# Patient Record
Sex: FEMALE | Race: BLACK OR AFRICAN AMERICAN | NOT HISPANIC OR LATINO | Employment: OTHER | URBAN - METROPOLITAN AREA
[De-identification: names, ages, dates, MRNs, and addresses within clinical notes are randomized per-mention and may not be internally consistent; named-entity substitution may affect disease eponyms.]

---

## 2017-10-31 ENCOUNTER — OFFICE VISIT (OUTPATIENT)
Dept: SURGERY | Facility: CLINIC | Age: 67
End: 2017-10-31
Payer: MEDICARE

## 2017-10-31 ENCOUNTER — TELEPHONE (OUTPATIENT)
Dept: ENDOSCOPY | Facility: HOSPITAL | Age: 67
End: 2017-10-31

## 2017-10-31 VITALS — HEART RATE: 65 BPM | SYSTOLIC BLOOD PRESSURE: 141 MMHG | WEIGHT: 199.5 LBS | DIASTOLIC BLOOD PRESSURE: 59 MMHG

## 2017-10-31 DIAGNOSIS — Z87.19 HISTORY OF ULCERATIVE COLITIS: Primary | ICD-10-CM

## 2017-10-31 DIAGNOSIS — R19.7 DIARRHEA, UNSPECIFIED TYPE: ICD-10-CM

## 2017-10-31 PROCEDURE — 99203 OFFICE O/P NEW LOW 30 MIN: CPT | Mod: PBBFAC | Performed by: COLON & RECTAL SURGERY

## 2017-10-31 PROCEDURE — 99203 OFFICE O/P NEW LOW 30 MIN: CPT | Mod: S$PBB,,, | Performed by: COLON & RECTAL SURGERY

## 2017-10-31 PROCEDURE — 99999 PR PBB SHADOW E&M-NEW PATIENT-LVL III: CPT | Mod: PBBFAC,,, | Performed by: COLON & RECTAL SURGERY

## 2017-10-31 RX ORDER — CHOLESTYRAMINE 4 G/9G
4 POWDER, FOR SUSPENSION ORAL
Qty: 60 PACKET | Refills: 3 | Status: SHIPPED | OUTPATIENT
Start: 2017-10-31 | End: 2017-11-01 | Stop reason: SDUPTHER

## 2017-10-31 RX ORDER — LEVOTHYROXINE SODIUM 25 UG/1
25 TABLET ORAL DAILY
COMMUNITY

## 2017-10-31 RX ORDER — HYDROCHLOROTHIAZIDE 12.5 MG/1
12.5 TABLET ORAL DAILY
COMMUNITY

## 2017-10-31 RX ORDER — NAPROXEN SODIUM 220 MG/1
81 TABLET, FILM COATED ORAL DAILY
COMMUNITY

## 2017-10-31 RX ORDER — FERROUS SULFATE 324(65)MG
325 TABLET, DELAYED RELEASE (ENTERIC COATED) ORAL DAILY
COMMUNITY

## 2017-10-31 NOTE — LETTER
November 13, 2017      Maggie Tatum MD  [address] [place] [address] Blvd  [address]  [facility]  [address] [address]           [facility]  [address]  [facility] [address]  Phone: [phone]          Patient: Daisy Brambila   MR Number: [id number]   YOB: 1950   Date of Visit: 10/31/2017       Dear Dr. Maggie Tatum:    Thank you for referring Daisy Brambila to me for evaluation. Attached you will find relevant portions of my assessment and plan of care.    If you have questions, please do not hesitate to call me. I look forward to following Daisy Brambila along with you.    Sincerely,    Jodi Phillips  CC:  No Recipients    If you would like to receive this communication electronically, please contact [email or web address] or [phone] to request more information on [employer] Link access.    For providers and/or their staff who would like to refer a patient to [facility], please contact us through our one-stop-shop provider referral line, [facility], at [phone].    If you feel you have received this communication in error or would no longer like to receive these types of communications, please e-mail [email or web address]

## 2017-10-31 NOTE — PROGRESS NOTES
CRS Office Visit    SUBJECTIVE:     Chief Complaint: Fecal urgency/incontinence    History of Present Illness:  Patient is a 67 y.o. female with a history of UC s/p IPAA done at [facility] in . She initially did well from her surgery and reports having about 2 BM daily for several years. She did not have her ileostomy reversed until . Over the past few years, she has been having leakage of stool during the night and day which she can not control. She reports anywhere from 6-8 bowel movements daily depending on her level of stress. She has some clustering of her stools. She has tried metamucil in the past which did not help. She has tried imodium sparingly but is worried about potential side effects. She had a pouchoscopy done in  by Dr. Tatum. She denies blood in her BM or anal pain. She denies urinary incontinence. She has had 2  and one . She is also s/p cholecystectomy for gallstones.    Review of patient's allergies indicates:  No Known Allergies    No past medical history on file.  No past surgical history on file.  No family history on file.  Social History   Substance Use Topics    Smoking status: Not on file    Smokeless tobacco: Not on file    Alcohol use Not on file        Review of Systems:  Constitutional: no fever or chills  Eyes: no visual changes  ENT: no nasal congestion or sore throat  Respiratory: no cough or shortness of breath  Cardiovascular: no chest pain or palpitations  Gastrointestinal: no nausea or vomiting, tolerating diet, positive for diarrhea  Genitourinary: no hematuria or dysuria  Integument/Breast: no rash or pruritis  Hematologic/Lymphatic: no easy bruising or lymphadenopathy  Musculoskeletal: no arthralgias or myalgias  Neurological: no seizures or tremors  Behavioral/Psych: no auditory or visual hallucinations  Endocrine: no heat or cold intolerance    OBJECTIVE:     Vital Signs (Most Recent)  Pulse: 65 (10/31/17 1019)  BP: (!) 141/59 (10/31/17  1019)    Physical Exam:  General: Black or  female in NAD sitting in chair in clinic  Neuro: aaox4 maex4 perrl  Respiratory: resps even unlabored  Cardiac: cap refill <2 sec  Abdomen: Normal, benign.  Extremities: Warm dry and intact      ASSESSMENT/PLAN:     Diagnoses and all orders for this visit:    History of ulcerative colitis    Diarrhea, unspecified type      Advised increasing fiber in diet in form of raw fruits, vegetables, and grains. Also advised taking imodium, starting with 2mg PO daily to see if that helps to thicken her bowel movements. May also try questran given her history of cholecystectomy if these measures dont help. Anatomy not clear from reading Dr. Tatum's notes and so we will schedule her for a pouchoscopy to evaluate for any pouchitis or residual rectum, as his note states that she has an ileorectal anastomosis rather than ileoanal.     I have interviewed and examined the patient, reviewed the notes and assessments, and/or personally supervised the procedure(s) performed by Dr. Watson, and I concur with her/his documentation of Daisy Brambila.  See below addendum for my evaluation and additional findings.    Schedule for pouchoscopy - not clear from records what level of her ileal pouch anastomosis is at.  Increase fiber intake.        Narendra Ellis MD, FACS, FASCRS  Staff Surgeon  Department of Colon & Rectal Surgery

## 2017-11-01 RX ORDER — CHOLESTYRAMINE 4 G/9G
4 POWDER, FOR SUSPENSION ORAL
Qty: 60 PACKET | Refills: 3 | Status: SHIPPED | OUTPATIENT
Start: 2017-11-01 | End: 2018-02-24 | Stop reason: SDUPTHER

## 2017-11-02 ENCOUNTER — TELEPHONE (OUTPATIENT)
Dept: ENDOSCOPY | Facility: HOSPITAL | Age: 67
End: 2017-11-02

## 2017-11-02 NOTE — TELEPHONE ENCOUNTER
Patient called Endoscopy Scheduling to reschedule pouchoscopy. Procedure rescheduled to 12/1/17. New instructions mailed to patient. Patient verbalized understanding.

## 2017-12-01 ENCOUNTER — ANESTHESIA EVENT (OUTPATIENT)
Dept: ENDOSCOPY | Facility: HOSPITAL | Age: 67
End: 2017-12-01
Payer: MEDICARE

## 2017-12-01 ENCOUNTER — SURGERY (OUTPATIENT)
Age: 67
End: 2017-12-01

## 2017-12-01 ENCOUNTER — HOSPITAL ENCOUNTER (OUTPATIENT)
Facility: HOSPITAL | Age: 67
Discharge: HOME | End: 2017-12-01
Attending: COLON & RECTAL SURGERY | Admitting: COLON & RECTAL SURGERY
Payer: MEDICARE

## 2017-12-01 ENCOUNTER — ANESTHESIA (OUTPATIENT)
Dept: ENDOSCOPY | Facility: HOSPITAL | Age: 67
End: 2017-12-01
Payer: MEDICARE

## 2017-12-01 VITALS
HEIGHT: 66 IN | DIASTOLIC BLOOD PRESSURE: 78 MMHG | OXYGEN SATURATION: 98 % | WEIGHT: 197 LBS | TEMPERATURE: 98 F | SYSTOLIC BLOOD PRESSURE: 140 MMHG | RESPIRATION RATE: 14 BRPM | HEART RATE: 67 BPM | BODY MASS INDEX: 31.66 KG/M2

## 2017-12-01 DIAGNOSIS — Z12.11 SCREENING FOR COLON CANCER: Primary | ICD-10-CM

## 2017-12-01 PROCEDURE — 44385 ENDOSCOPY OF BOWEL POUCH: CPT | Performed by: COLON & RECTAL SURGERY

## 2017-12-01 PROCEDURE — 37000009 HC ANESTHESIA EA ADD 15 MINS: Performed by: COLON & RECTAL SURGERY

## 2017-12-01 PROCEDURE — 63600175 PHARM REV CODE 636 W HCPCS: Performed by: NURSE ANESTHETIST, CERTIFIED REGISTERED

## 2017-12-01 PROCEDURE — 37000008 HC ANESTHESIA 1ST 15 MINUTES: Performed by: COLON & RECTAL SURGERY

## 2017-12-01 PROCEDURE — D9220A PRA ANESTHESIA: Mod: CRNA,,, | Performed by: NURSE ANESTHETIST, CERTIFIED REGISTERED

## 2017-12-01 PROCEDURE — 25000003 PHARM REV CODE 250: Performed by: NURSE PRACTITIONER

## 2017-12-01 PROCEDURE — 44385 ENDOSCOPY OF BOWEL POUCH: CPT | Mod: ,,, | Performed by: COLON & RECTAL SURGERY

## 2017-12-01 PROCEDURE — D9220A PRA ANESTHESIA: Mod: ANES,,, | Performed by: ANESTHESIOLOGY

## 2017-12-01 RX ORDER — LIDOCAINE HCL/PF 100 MG/5ML
SYRINGE (ML) INTRAVENOUS
Status: DISCONTINUED | OUTPATIENT
Start: 2017-12-01 | End: 2017-12-01

## 2017-12-01 RX ORDER — PROPOFOL 10 MG/ML
VIAL (ML) INTRAVENOUS
Status: DISCONTINUED | OUTPATIENT
Start: 2017-12-01 | End: 2017-12-01

## 2017-12-01 RX ORDER — PROPOFOL 10 MG/ML
VIAL (ML) INTRAVENOUS CONTINUOUS PRN
Status: DISCONTINUED | OUTPATIENT
Start: 2017-12-01 | End: 2017-12-01

## 2017-12-01 RX ORDER — SODIUM CHLORIDE 9 MG/ML
INJECTION, SOLUTION INTRAVENOUS CONTINUOUS
Status: DISCONTINUED | OUTPATIENT
Start: 2017-12-01 | End: 2017-12-01 | Stop reason: HOSPADM

## 2017-12-01 RX ADMIN — LIDOCAINE HYDROCHLORIDE 50 MG: 20 INJECTION, SOLUTION INTRAVENOUS at 11:12

## 2017-12-01 RX ADMIN — PROPOFOL 80 MG: 10 INJECTION, EMULSION INTRAVENOUS at 11:12

## 2017-12-01 RX ADMIN — PROPOFOL 150 MCG/KG/MIN: 10 INJECTION, EMULSION INTRAVENOUS at 11:12

## 2017-12-01 RX ADMIN — SODIUM CHLORIDE: 0.9 INJECTION, SOLUTION INTRAVENOUS at 10:12

## 2017-12-01 NOTE — DISCHARGE INSTRUCTIONS
Colonoscopy     A camera attached to a flexible tube with a viewing lens is used to take video pictures.     Colonoscopy is a test to view the inside of your lower digestive tract (colon and rectum). Sometimes it can show the last part of the small intestine (ileum). During the test, small pieces of tissue may be removed for testing. This is called a biopsy. Small growths, such as polyps, may also be removed.   Why is colonoscopy done?  The test is done to help look for colon cancer. And it can help find the source of abdominal pain, bleeding, and changes in bowel habits. It may be needed once a year, depending on factors such as your:  · Age  · Health history  · Family health history  · Symptoms  · Results from any prior colonoscopy  Risks and possible complications  These include:  · Bleeding               · A puncture or tear in the colon   · Risks of anesthesia  · A cancer lesion not being seen  Getting ready   To prepare for the test:  · Talk with your healthcare provider about the risks of the test (see below). Also ask your healthcare provider about alternatives to the test.  · Tell your healthcare provider about any medicines you take. Also tell him or her about any health conditions you may have.  · Make sure your rectum and colon are empty for the test. Follow the diet and bowel prep instructions exactly. If you dont, the test may need to be rescheduled.  · Plan for a friend or family member to drive you home after the test.     Colonoscopy provides an inside view of the entire colon.     You may discuss the results with your doctor right away or at a future visit.  During the test   The test is usually done in the hospital on an outpatient basis. This means you go home the same day. The procedure takes about 30 minutes. During that time:  · You are given relaxing (sedating) medicine through an IV line. You may be drowsy, or fully asleep.  · The healthcare provider will first give you a physical exam to  check for anal and rectal problems.  · Then the anus is lubricated and the scope inserted.  · If you are awake, you may have a feeling similar to needing to have a bowel movement. You may also feel pressure as air is pumped into the colon. Its OK to pass gas during the procedure.  · Biopsy, polyp removal, or other treatments may be done during the test.  After the test   You may have gas right after the test. It can help to try to pass it to help prevent later bloating. Your healthcare provider may discuss the results with you right away. Or you may need to schedule a follow-up visit to talk about the results. After the test, you can go back to your normal eating and other activities. You may be tired from the sedation and need to rest for a few hours.  Date Last Reviewed: 11/1/2016 © 2000-2017 [employer]. [address] [place] [address]. All rights reserved. This information is not intended as a substitute for professional medical care. Always follow your healthcare professional's instructions.        Ileostomy: Changing Your Pouch    Stool starts to pass from the stoma soon after surgery. At first, a nurse will change your pouch. But youll need to learn how to change it yourself before you go home. You will need to change your pouch 1 to 2 times a week. You will empty it more often. To change your pouch, follow the steps below.  Start by gathering what youll need:  · Plastic bags  · Clean towel  · Toilet paper  · Extra skin protection, if desired  · Soft washcloth  · New pouch  Step 1. Remove the used pouch  · If you use a drainable pouch, empty it first. Open the Velcro closures or remove the clamp and set it aside.  · Sit on or next to the toilet.  · Start at the upper edge of the skin barrier. Carefully push the skin away from the barrier with 1 hand. Slowly peel back the skin barrier with the other hand.  · Peel all the way around the skin barrier until the pouch comes off. Seal  the pouch in a plastic bag. Then put it in a second plastic bag. Throw it away in a trash bin.  Step 2. Clean around the stoma  ·   Wipe any stool off the skin around the stoma with toilet paper.  · Clean the skin with warm water and a soft washcloth. Wash right up to the edge of the stoma.  · Pat the skin dry with a clean towel.  · Put on extra skin protection, such as moisture barrier paste, cream, or powder if desired.  Step 3. Put on the new pouch  · If you dont use a pouch with a precut skin barrier, size and cut the opening.  · Slowly peel the backing off the barrier and carefully place it over the stoma.  · If you use a 2-piece pouch, snap the pouch onto the barrier. Start at the bottom and work your fingers around the flange.  · Press the barrier against the skin with your fingertips. Lay the palm of your hand over the barrier and hold it in place for 45 seconds. This molds the barrier to your skin.  · Secure the Velcro closures shut or clamp the tail of the pouch.       Call your wound, ostomy, and continence nurse (WOCN)  Call your WOCN if:  · The skin around the stoma is red, weepy, bleeding, or broken.  · The skin around the stoma itches, burns, stings, or has white spots.  · The stoma swells, changes color, or bleeds without stopping.  · The stoma becomes even with or sinks below the skin, or it sticks up more than normal.   Date Last Reviewed: 8/1/2016 © 2000-2017 [employer]. [address] [place] [address], [place] [address]. All rights reserved. This information is not intended as a substitute for professional medical care. Always follow your healthcare professional's instructions.

## 2017-12-01 NOTE — TRANSFER OF CARE
"Anesthesia Transfer of Care Note    Patient: Daisy Brambila    Procedure(s) Performed: Procedure(s) (LRB):  POUCHOSCOPY (N/A)    Patient location: PACU    Anesthesia Type: general    Transport from OR: Transported from OR on room air with adequate spontaneous ventilation    Post pain: adequate analgesia    Post assessment: no apparent anesthetic complications and tolerated procedure well    Post vital signs: stable    Level of consciousness: sedated and responds to stimulation    Nausea/Vomiting: no nausea/vomiting    Complications: none    Transfer of care protocol was followed      Last vitals:   Visit Vitals  BP (!) 96/54   Pulse 64   Temp 36.7 °C (98.1 °F)   Resp 17   Ht 5' 6" (1.676 m)   Wt 89.4 kg (197 lb)   SpO2 97%   Breastfeeding? No   BMI 31.80 kg/m²     "

## 2017-12-01 NOTE — PATIENT INSTRUCTIONS
Discharge Summary/Instructions after an Endoscopic Procedure  Patient Name: Daisy Brambila  Patient MRN: [id number]  Patient YOB: 1950 Friday, December 01, 2017  Narendra Ellis MD  RESTRICTIONS:  During your procedure today, you received medications for sedation.  These   medications may affect your judgment, balance and coordination.  Therefore,   for 24 hours, you have the following restrictions:   - DO NOT drive a car, operate machinery, make legal/financial decisions,   sign important papers or drink alcohol.    ACTIVITY:  The following day: return to full activity including work, except no heavy   lifting, straining or running for 3 days if polyps were removed.  DIET:  Eat and drink normally unless instructed otherwise.     TREATMENT FOR COMMON SIDE EFFECTS:  - Mild abdominal pain, belching, bloating or excessive gas: rest, eat   lightly and use a heating pad.  - Sore Throat: treat with throat lozenges and/or gargle with warm salt   water.  SYMPTOMS TO WATCH FOR AND REPORT TO YOUR PHYSICIAN:  1. Abdominal pain or bloating, other than gas cramps.  2. Chest pain.  3. Back pain.  4. Chills or fever occurring within 24 hours after the procedure.  5. Rectal bleeding, which would show as bright red, maroon, or black stools.   (A tablespoon of blood from the rectum is not serious, especially if   hemorrhoids are present.)  6. Vomiting.  7. Weakness or dizziness.  8. Because air was used during the procedure, expelling large amounts of air   from your rectum or belching is normal.  9. If a bowel prep was taken, you may not have a bowel movement for 1-3   days.  This is normal.  GO DIRECTLY TO THE NEAREST EMERGENCY ROOM IF YOU HAVE ANY OF THE FOLLOWING:      Difficulty breathing  Chills and/or fever over 101 F   Persistent vomiting and/or vomiting blood   Severe abdominal pain   Severe chest pain   Black, tarry stools   Bleeding- more than one tablespoon   Any other symptom or condition that you may feel needs  urgent attention  Your doctor recommends these additional instructions:  If any biopsies were taken, your doctor s clinic will contact you in 1 to 2   weeks with any results.  Your physician has recommended a repeat post-surgical lower GI endoscopy in   five years for screening purposes.   You are being discharged to home.   Resume your previous diet.   You have a contact number available for emergencies.  The signs and symptoms   of potential delayed complications were discussed with you.  You may return   to normal activities tomorrow.  Written discharge instructions were   provided to you.   Continue your present medications.  For questions, problems or results please call your physician - Narendra Ellis MD at Work:  [phone], Work:  [phone].  [facility], EMERGENCY ROOM PHONE NUMBER: [phone]  IF A COMPLICATION OR EMERGENCY SITUATION ARISES AND YOU ARE UNABLE TO REACH   YOUR PHYSICIAN - GO DIRECTLY TO THE EMERGENCY ROOM.  Narendra Ellis MD  12/1/2017 11:59:55 AM  This report has been verified and signed electronically.

## 2017-12-01 NOTE — ANESTHESIA PREPROCEDURE EVALUATION
12/01/2017  Daisy Brambila is a 67 y.o., female.    Pre-operative evaluation for Procedure(s) (LRB):  POUCHOSCOPY (N/A)    Daisy Brambila is a 67 y.o. female     Patient Active Problem List   Diagnosis    Screening for colon cancer       Review of patient's allergies indicates:   Allergen Reactions    Codeine Other (See Comments)     sores    Naproxen Hives and Itching       No current facility-administered medications on file prior to encounter.      Current Outpatient Prescriptions on File Prior to Encounter   Medication Sig Dispense Refill    aspirin 81 MG Chew Take 81 mg by mouth once daily.      cholestyramine, with sugar, 4 gram Powd Take 4 g by mouth 3 (three) times daily with meals. 60 packet 3    ferrous sulfate 324 mg (65 mg iron) TbEC Take 325 mg by mouth once daily.      hydroCHLOROthiazide (HYDRODIURIL) 12.5 MG Tab Take 12.5 mg by mouth once daily.      levothyroxine (SYNTHROID) 25 MCG tablet Take 25 mcg by mouth once daily.      mineral oil-hydrophil petrolat (AQUAPHOR) Oint Apply topically as needed.         Past Surgical History:   Procedure Laterality Date    COLECTOMY      gall stone      ESEQUIEL FILTER PLACEMENT      TOTAL KNEE ARTHROPLASTY Left        Social History     Social History    Marital status: Significant Other     Spouse name: N/A    Number of children: N/A    Years of education: N/A     Occupational History    Not on file.     Social History Main Topics    Smoking status: Never Smoker    Smokeless tobacco: Never Used    Alcohol use No    Drug use: No    Sexual activity: Not on file     Other Topics Concern    Not on file     Social History Narrative    No narrative on file         Vital Signs Range (Last 24H):  Temp:  [36.8 °C (98.2 °F)]   Pulse:  [73]   Resp:  [14]   BP: (158)/(74)   SpO2:  [95 %]     Anesthesia Evaluation    I have reviewed the  Patient Summary Reports.    I have reviewed the Nursing Notes.   I have reviewed the Medications.     Review of Systems  Anesthesia Hx:  No problems with previous Anesthesia  History of prior surgery of interest to airway management or planning: Denies Family Hx of Anesthesia complications.    Social:  Non-Smoker, No Alcohol Use    Cardiovascular:   Exercise tolerance: good Hypertension    Hepatic/GI:   Ulcerative colitis   Endocrine:   Hypothyroidism        Physical Exam  General:  Well nourished    Airway/Jaw/Neck:  Airway Findings: Mouth Opening: Normal Tongue: Normal  General Airway Assessment: Adult  Mallampati: III  Improves to II with phonation.  TM Distance: Normal, at least 6 cm  Jaw/Neck Findings:  Neck ROM: Extension Decreased, Mild      Dental:  Dental Findings: In tact   Chest/Lungs:  Chest/Lungs Findings: Clear to auscultation     Heart/Vascular:  Heart Findings: Rate: Normal        Mental Status:  Mental Status Findings:  Cooperative, Alert and Oriented         Anesthesia Plan  Type of Anesthesia, risks & benefits discussed:  Anesthesia Type:  general  Patient's Preference:   Intra-op Monitoring Plan: standard ASA monitors  Intra-op Monitoring Plan Comments:   Post Op Pain Control Plan: IV/PO Opioids PRN  Post Op Pain Control Plan Comments:   Induction:   IV  Beta Blocker:  Patient is not currently on a Beta-Blocker (No further documentation required).       Informed Consent: Patient understands risks and agrees with Anesthesia plan.  Questions answered. Anesthesia consent signed with patient.  ASA Score: 2     Day of Surgery Review of History & Physical:    H&P update referred to the provider.         Ready For Surgery From Anesthesia Perspective.

## 2017-12-01 NOTE — H&P
Procedure : Pouchoscopy    Indication(s):  Hx of JENIFER, s/p proctocolectomy, evaluate j-pouch    Review of patient's allergies indicates:   Allergen Reactions    Codeine Other (See Comments)     sores    Naproxen Hives and Itching       Past Medical History:   Diagnosis Date    H/O blood clots     Hypothyroid     Ulcerative colitis        Prior to Admission medications    Medication Sig Start Date End Date Taking? Authorizing Provider   aspirin 81 MG Chew Take 81 mg by mouth once daily.   Yes Historical Provider, MD   cholestyramine, with sugar, 4 gram Powd Take 4 g by mouth 3 (three) times daily with meals. 11/1/17 11/1/18 Yes Narendra Ellis MD   ferrous sulfate 324 mg (65 mg iron) TbEC Take 325 mg by mouth once daily.   Yes Historical Provider, MD   hydroCHLOROthiazide (HYDRODIURIL) 12.5 MG Tab Take 12.5 mg by mouth once daily.   Yes Historical Provider, MD   levothyroxine (SYNTHROID) 25 MCG tablet Take 25 mcg by mouth once daily.   Yes Historical Provider, MD   mineral oil-hydrophil petrolat (AQUAPHOR) Oint Apply topically as needed.   Yes Historical Provider, MD       Sedation Problems: NO    History reviewed. No pertinent family history.    Fam Hx of Sedation Problems: NO    Social History     Social History    Marital status: Significant Other     Spouse name: N/A    Number of children: N/A    Years of education: N/A     Occupational History    Not on file.     Social History Main Topics    Smoking status: Never Smoker    Smokeless tobacco: Never Used    Alcohol use No    Drug use: No    Sexual activity: Not on file     Other Topics Concern    Not on file     Social History Narrative    No narrative on file       Review of Systems -     Respiratory ROS: no cough, shortness of breath, or wheezing  Cardiovascular ROS: no chest pain or dyspnea on exertion  Gastrointestinal ROS: positive for - diarrhea  Musculoskeletal ROS: negative  Neurological ROS: no TIA or stroke symptoms        Physical  Exam:  General: no distress  Head: normocephalic  Airway:  normal oropharynx, airway normal  Neck: supple, symmetrical, trachea midline  Lungs:  normal respiratory effort  Heart: regular rate and rhythm  Abdomen: soft, non-tender non-distented; bowel sounds normal; no masses,  no organomegaly  Extremities: no cyanosis or edema, or clubbing       Deep Sedation: Mallampati Score per anesthesia     SedationPlan :Moderate     ASA : II    Patient is medically cleared for anesthesia.    Anesthesia/Surgery risks, benefits and alternative options discussed and understood by patient/family.

## 2017-12-04 NOTE — ANESTHESIA POSTPROCEDURE EVALUATION
"Anesthesia Post Evaluation    Patient: Daisy Brambila    Procedure(s) Performed: Procedure(s) (LRB):  POUCHOSCOPY (N/A)    Final Anesthesia Type: general  Patient location during evaluation: GI PACU  Patient participation: Yes- Able to Participate  Level of consciousness: awake and alert  Post-procedure vital signs: reviewed and stable  Pain management: adequate  Airway patency: patent  PONV status at discharge: No PONV  Anesthetic complications: no      Cardiovascular status: blood pressure returned to baseline  Respiratory status: unassisted  Hydration status: euvolemic  Follow-up not needed.        Visit Vitals  BP (!) 140/78   Pulse 67   Temp 36.7 °C (98.1 °F)   Resp 14   Ht 5' 6" (1.676 m)   Wt 89.4 kg (197 lb)   SpO2 98%   Breastfeeding? No   BMI 31.80 kg/m²       Pain/Anthony Score: No Data Recorded      "

## 2017-12-08 ENCOUNTER — TELEPHONE (OUTPATIENT)
Dept: ENDOSCOPY | Facility: HOSPITAL | Age: 67
End: 2017-12-08

## 2018-02-24 DIAGNOSIS — R19.7 DIARRHEA, UNSPECIFIED TYPE: ICD-10-CM

## 2018-02-24 RX ORDER — CHOLESTYRAMINE 4 G/9G
POWDER, FOR SUSPENSION ORAL
Qty: 60 PACKET | Refills: 5 | Status: SHIPPED | OUTPATIENT
Start: 2018-02-24 | End: 2020-04-24

## 2019-04-23 ENCOUNTER — TELEPHONE (OUTPATIENT)
Dept: SURGERY | Facility: CLINIC | Age: 69
End: 2019-04-23

## 2019-04-23 NOTE — TELEPHONE ENCOUNTER
Left message for patient informing her that the refill for questran should be ready for pickup at her pharmacy.

## 2019-04-23 NOTE — TELEPHONE ENCOUNTER
----- Message from Alysa Wilhelm sent at 4/23/2019  9:19 AM CDT -----  Contact: self [phone]  Needs Advice    Reason for call: Pt called stating the pharmacy told her Dr. Ellis didn't approve to refill the (cholestyramine (QUESTRAN) 4 gram packet), and pt is inquiring why?         Communication Preference: self [phone]    Additional Information:

## 2020-04-23 DIAGNOSIS — R19.7 DIARRHEA, UNSPECIFIED TYPE: ICD-10-CM

## 2020-04-24 RX ORDER — CHOLESTYRAMINE 4 G/9G
POWDER, FOR SUSPENSION ORAL
Qty: 60 PACKET | Refills: 6 | Status: SHIPPED | OUTPATIENT
Start: 2020-04-24

## 2025-06-18 ENCOUNTER — TELEPHONE (OUTPATIENT)
Dept: SURGERY | Facility: CLINIC | Age: 75
End: 2025-06-18
Payer: MEDICARE

## 2025-07-25 ENCOUNTER — TELEPHONE (OUTPATIENT)
Dept: SURGERY | Facility: CLINIC | Age: 75
End: 2025-07-25
Payer: MEDICARE

## 2025-07-25 NOTE — TELEPHONE ENCOUNTER
Called pt to confirm appt with Dr. Ellis on 7/28//25 at 9:40 AM at [facility] (2nd floor). LVM with appt's date, time, location and CRS' office number because the pt did not answer. Also LVM on pt's daughter's phone ([phone]). Pt does not use the portal.

## 2025-08-04 ENCOUNTER — TELEPHONE (OUTPATIENT)
Dept: GASTROENTEROLOGY | Facility: CLINIC | Age: 75
End: 2025-08-04
Payer: MEDICARE

## 2025-08-04 NOTE — TELEPHONE ENCOUNTER
----- Message from Emeli Aguirre MD sent at 7/30/2025 12:09 PM CDT -----  Regarding: RE: New IBD referral from [facility], Dr. Aranda  She already has a GI doctor, Dr Tatum. This is a referral from PCP. I don't know if she has active UC. The patient should follow up with her GI doctor and if they need help with managing her UC they can send us the records for review.  ----- Message -----  From: Sade Pendleton MA  Sent: 7/24/2025   2:22 PM CDT  To: Emeli Aguirre MD  Subject: New IBD referral from [facility], Dr. Aranda             Hi,     Dr. Aranda is referring Mrs. Brambila for IBD. Please review records and advise, thanks.

## 2025-08-04 NOTE — TELEPHONE ENCOUNTER
MA attempted to contact patient, but she did not answer. A voicemail was left for to return a call to our office.

## 2025-08-18 ENCOUNTER — TELEPHONE (OUTPATIENT)
Dept: GASTROENTEROLOGY | Facility: CLINIC | Age: 75
End: 2025-08-18
Payer: MEDICARE